# Patient Record
Sex: FEMALE | Race: WHITE
[De-identification: names, ages, dates, MRNs, and addresses within clinical notes are randomized per-mention and may not be internally consistent; named-entity substitution may affect disease eponyms.]

---

## 2018-03-25 ENCOUNTER — HOSPITAL ENCOUNTER (OUTPATIENT)
Dept: HOSPITAL 17 - PHED | Age: 63
Setting detail: OBSERVATION
LOS: 1 days | Discharge: HOME | End: 2018-03-26
Attending: HOSPITALIST | Admitting: HOSPITALIST
Payer: COMMERCIAL

## 2018-03-25 VITALS
RESPIRATION RATE: 18 BRPM | HEART RATE: 80 BPM | SYSTOLIC BLOOD PRESSURE: 113 MMHG | DIASTOLIC BLOOD PRESSURE: 65 MMHG | OXYGEN SATURATION: 98 %

## 2018-03-25 VITALS
DIASTOLIC BLOOD PRESSURE: 61 MMHG | SYSTOLIC BLOOD PRESSURE: 95 MMHG | OXYGEN SATURATION: 98 % | HEART RATE: 79 BPM | RESPIRATION RATE: 18 BRPM

## 2018-03-25 VITALS
TEMPERATURE: 97.6 F | HEART RATE: 91 BPM | OXYGEN SATURATION: 92 % | DIASTOLIC BLOOD PRESSURE: 79 MMHG | SYSTOLIC BLOOD PRESSURE: 130 MMHG | RESPIRATION RATE: 17 BRPM

## 2018-03-25 VITALS
SYSTOLIC BLOOD PRESSURE: 98 MMHG | HEART RATE: 85 BPM | DIASTOLIC BLOOD PRESSURE: 53 MMHG | OXYGEN SATURATION: 98 % | TEMPERATURE: 97.7 F | RESPIRATION RATE: 18 BRPM

## 2018-03-25 VITALS — HEART RATE: 81 BPM

## 2018-03-25 DIAGNOSIS — Z79.899: ICD-10-CM

## 2018-03-25 DIAGNOSIS — Z96.653: ICD-10-CM

## 2018-03-25 DIAGNOSIS — I10: ICD-10-CM

## 2018-03-25 DIAGNOSIS — K75.4: ICD-10-CM

## 2018-03-25 DIAGNOSIS — Z79.82: ICD-10-CM

## 2018-03-25 DIAGNOSIS — E11.9: ICD-10-CM

## 2018-03-25 DIAGNOSIS — Z82.49: ICD-10-CM

## 2018-03-25 DIAGNOSIS — E78.00: ICD-10-CM

## 2018-03-25 DIAGNOSIS — N17.9: ICD-10-CM

## 2018-03-25 DIAGNOSIS — M54.12: ICD-10-CM

## 2018-03-25 DIAGNOSIS — R07.89: Primary | ICD-10-CM

## 2018-03-25 LAB
ALBUMIN SERPL-MCNC: 3.4 GM/DL (ref 3.4–5)
ALP SERPL-CCNC: 105 U/L (ref 45–117)
ALT SERPL-CCNC: 38 U/L (ref 10–53)
AST SERPL-CCNC: 26 U/L (ref 15–37)
BASOPHILS # BLD AUTO: 0 TH/MM3 (ref 0–0.2)
BASOPHILS NFR BLD: 0.3 % (ref 0–2)
BILIRUB SERPL-MCNC: 0.3 MG/DL (ref 0.2–1)
BUN SERPL-MCNC: 21 MG/DL (ref 7–18)
CALCIUM SERPL-MCNC: 9.6 MG/DL (ref 8.5–10.1)
CHLORIDE SERPL-SCNC: 109 MEQ/L (ref 98–107)
CREAT SERPL-MCNC: 1.3 MG/DL (ref 0.5–1)
EOSINOPHIL # BLD: 0.1 TH/MM3 (ref 0–0.4)
EOSINOPHIL NFR BLD: 0.7 % (ref 0–4)
ERYTHROCYTE [DISTWIDTH] IN BLOOD BY AUTOMATED COUNT: 15.2 % (ref 11.6–17.2)
GFR SERPLBLD BASED ON 1.73 SQ M-ARVRAT: 41 ML/MIN (ref 89–?)
GLUCOSE SERPL-MCNC: 69 MG/DL (ref 74–106)
HCO3 BLD-SCNC: 21.9 MEQ/L (ref 21–32)
HCT VFR BLD CALC: 33.4 % (ref 35–46)
HGB BLD-MCNC: 10.8 GM/DL (ref 11.6–15.3)
INR PPP: 1 RATIO
LYMPHOCYTES # BLD AUTO: 1.6 TH/MM3 (ref 1–4.8)
LYMPHOCYTES NFR BLD AUTO: 14.1 % (ref 9–44)
MAGNESIUM SERPL-MCNC: 2 MG/DL (ref 1.5–2.5)
MCH RBC QN AUTO: 28.2 PG (ref 27–34)
MCHC RBC AUTO-ENTMCNC: 32.3 % (ref 32–36)
MCV RBC AUTO: 87.3 FL (ref 80–100)
MONOCYTE #: 0.8 TH/MM3 (ref 0–0.9)
MONOCYTES NFR BLD: 7.1 % (ref 0–8)
NEUTROPHILS # BLD AUTO: 8.8 TH/MM3 (ref 1.8–7.7)
NEUTROPHILS NFR BLD AUTO: 77.8 % (ref 16–70)
PLATELET # BLD: 243 TH/MM3 (ref 150–450)
PMV BLD AUTO: 7.6 FL (ref 7–11)
PROT SERPL-MCNC: 7.2 GM/DL (ref 6.4–8.2)
PROTHROMBIN TIME: 10.2 SEC (ref 9.8–11.6)
RBC # BLD AUTO: 3.82 MIL/MM3 (ref 4–5.3)
SODIUM SERPL-SCNC: 140 MEQ/L (ref 136–145)
TROPONIN I SERPL-MCNC: (no result) NG/ML (ref 0.02–0.05)
WBC # BLD AUTO: 11.3 TH/MM3 (ref 4–11)

## 2018-03-25 PROCEDURE — 78452 HT MUSCLE IMAGE SPECT MULT: CPT

## 2018-03-25 PROCEDURE — 93005 ELECTROCARDIOGRAM TRACING: CPT

## 2018-03-25 PROCEDURE — 83690 ASSAY OF LIPASE: CPT

## 2018-03-25 PROCEDURE — 83735 ASSAY OF MAGNESIUM: CPT

## 2018-03-25 PROCEDURE — G0378 HOSPITAL OBSERVATION PER HR: HCPCS

## 2018-03-25 PROCEDURE — 84484 ASSAY OF TROPONIN QUANT: CPT

## 2018-03-25 PROCEDURE — A9502 TC99M TETROFOSMIN: HCPCS

## 2018-03-25 PROCEDURE — 85730 THROMBOPLASTIN TIME PARTIAL: CPT

## 2018-03-25 PROCEDURE — 96361 HYDRATE IV INFUSION ADD-ON: CPT

## 2018-03-25 PROCEDURE — 85610 PROTHROMBIN TIME: CPT

## 2018-03-25 PROCEDURE — 85025 COMPLETE CBC W/AUTO DIFF WBC: CPT

## 2018-03-25 PROCEDURE — 99285 EMERGENCY DEPT VISIT HI MDM: CPT

## 2018-03-25 PROCEDURE — 71045 X-RAY EXAM CHEST 1 VIEW: CPT

## 2018-03-25 PROCEDURE — 96374 THER/PROPH/DIAG INJ IV PUSH: CPT

## 2018-03-25 PROCEDURE — 82552 ASSAY OF CPK IN BLOOD: CPT

## 2018-03-25 PROCEDURE — 96372 THER/PROPH/DIAG INJ SC/IM: CPT

## 2018-03-25 PROCEDURE — 82550 ASSAY OF CK (CPK): CPT

## 2018-03-25 PROCEDURE — 80048 BASIC METABOLIC PNL TOTAL CA: CPT

## 2018-03-25 PROCEDURE — 93017 CV STRESS TEST TRACING ONLY: CPT

## 2018-03-25 PROCEDURE — 80053 COMPREHEN METABOLIC PANEL: CPT

## 2018-03-25 PROCEDURE — 82948 REAGENT STRIP/BLOOD GLUCOSE: CPT

## 2018-03-25 NOTE — PD
HPI


Chief Complaint:  Chest Pain


Time Seen by Provider:  17:44


Travel History


International Travel<30 days:  No


Contact w/Intl Traveler<30days:  No


Traveled to known affect area:  No





History of Present Illness


HPI


62-year-old female describes chest pain retrosternal with radiation to the 

bilateral arms to the level of the elbows.  She also reports some pain along 

the neck.  It has been present for about 2 weeks.  She reports no benefit from 

nitroglycerin this morning.  She has history of diabetes hypertension 

hyperlipidemia.  She has no smoking history.  There is a history of coronary 

artery disease in her family.  The patient denies any personal history of 

coronary artery disease.  An exertional component to the chest pain as noted.  

There's been no fever or cough.





PFSH


Past Medical History


High Cholesterol:  Yes


Diabetes:  Yes


Patient Takes Glucophage:  No


Hepatitis:  Yes


Hypertension:  Yes


Influenza Vaccination:  Yes


Pregnant?:  Not Pregnant





Past Surgical History


Abdominal Surgery:  Yes (HERNIAS)


Cholecystectomy:  Yes


Joint Replacement:  Yes (BILAT KNEES)





Social History


Alcohol Use:  No


Tobacco Use:  No


Substance Use:  No





Allergies-Medications


(Allergen,Severity, Reaction):  


Coded Allergies:  


     Iodinated Contrast- Oral and IV Dye (Verified  Allergy, Severe, HIVES, 3/25

/18)


Reported Meds & Prescriptions





Reported Meds & Active Scripts


Active


Reported


Nitroglycerin SL (Nitroglycerin) 0.4 Mg Subl 0.4 Mg SL AS DIRECTED PRN


     ONE TABLET UNDER THE TONGUE AS NEEDED FOR CHEST PAIN, MAY REPEAT EVERY


     FIVE MINUTES FOR A TOTAL OF 3 DOSES OR CALL 911 IF NO RELIEF


Prednisone 5 Mg Tab 5 Mg PO DAILY


Hydrocodone-Acetaminophen  mg Tab 1 Tab PO Q4H PRN


Zinc Sulfate 220 Mg Tab 220 Mg PO DAILY


Calcium 600 with Vitamin D (Calcium Carbonate-Cholecalciferol) 600-400 mg-Unit 

Tab 1 Tab PO DAILY


Magnesium Oxide 400 Mg Tab 400 Mg PO DAILY


Lantus Inj (Insulin Glargine) 1,000 Unit/10 Ml Vial 64 Units SQ HS


Novolin N Inj (Insulin Human NPH) 1,000 Unit/10 Ml Vial 0 SQ AS DIRECTED


     Sliding Scale As Directed.


Vitamin D-1000 (Cholecalciferol) 1,000 Unit Tab 50,000 Units PO WEEKLY


Fluticasone Nasal Spray 50 Mcg/Act Naspr 50 Mcg EACH NARE BID


     50 mcg/spray


Atorvastatin (Atorvastatin Calcium) 40 Mg Tab 40 Mg PO HS


Januvia (Sitagliptin Phosphate) 100 Mg Tab 100 Mg PO DAILY


Estrace (Estradiol) 0.5 Mg Tab 0.5 Mg PO DAILY


Nexium (Esomeprazole DR) 20 Mg Capdr 20 Mg PO DAILY


Alprazolam 0.5 Mg Tab 0.5 Mg PO Q8H PRN


Budesonide DR (Budesonide) 3 Mg Capdr 3 Mg PO TID


Verapamil SR (Verapamil HCl) 180 Mg Cap 180 Mg PO DAILY


Tricor (Fenofibrate) 145 Mg Tab 145 Mg PO DAILY


     Takw with food.


Tizanidine (Tizanidine HCl) 4 Mg Cap 4 Mg PO BID


Isosorbide Mononitrate ER (Isosorbide Mononitrate) 30 Mg Latoya 30 Mg PO DAILY


Gabapentin 300 Mg Cap 300 Mg PO TID


Clonidine (Clonidine HCl) 0.1 Mg Tab 0.1 Mg PO BID


Aspirin EC (Aspirin) 325 Mg Tabdr 325 Mg PO DAILY


Potassium Chloride ER (Potassium Chloride) 8 Meq Tab 24 Meq PO DAILY


Silenor (Doxepin (Sleep)) 6 Mg Tab 6 Mg PO HS


Norvasc (Amlodipine Besylate) 5 Mg Tab 5 Mg PO DAILY








Review of Systems


Except as stated in HPI:  all other systems reviewed are Neg


General / Constitutional:  No: Fever





Physical Exam


Narrative


GENERAL: 62-year-old female pleasant well-nourished well-developed





Vital Signs








  Date Time  Temp Pulse Resp B/P (MAP) Pulse Ox O2 Delivery O2 Flow Rate FiO2


 


3/25/18 18:29   18     


 


3/25/18 18:05  80 18 113/65 (81) 98 Room Air  


 


3/25/18 18:05     98 Room Air  


 


3/25/18 17:49  90      


 


3/25/18 17:45 97.7 85 18 98/53 (68) 98   








SKIN: Warm and dry.


HEAD: Atraumatic. Normocephalic. 


EYES: Pupils equal and round. No scleral icterus. No injection or drainage. 


ENT: No nasal bleeding or discharge.  Mucous membranes pink and moist.


NECK: Trachea midline. No JVD. 


CARDIOVASCULAR: Regular rate and rhythm.  


RESPIRATORY: No accessory muscle use. Clear to auscultation. Breath sounds 

equal bilaterally. 


GASTROINTESTINAL: Abdomen soft, non-tender, nondistended. Hepatic and splenic 

margins not palpable. 


MUSCULOSKELETAL: Extremities without clubbing, cyanosis, or edema. No obvious 

deformities. 


NEUROLOGICAL: Awake and alert. No obvious cranial nerve deficits.  Motor 

grossly within normal limits. Five out of 5 muscle strength in the arms and 

legs.  Normal speech.


PSYCHIATRIC: Appropriate mood and affect; insight and judgment normal.





Data


Data


Last Documented VS





Vital Signs








  Date Time  Temp Pulse Resp B/P (MAP) Pulse Ox O2 Delivery O2 Flow Rate FiO2


 


3/25/18 18:29   18     


 


3/25/18 18:05  80  113/65 (81) 98 Room Air  


 


3/25/18 17:45 97.7       








Orders





 Orders


Electrocardiogram (3/25/18 17:59)


Ckmb (Isoenzyme) Profile (3/25/18 17:59)


Complete Blood Count With Diff (3/25/18 17:59)


Comprehensive Metabolic Panel (3/25/18 17:59)


Magnesium (Mg) (3/25/18 17:59)


Prothrombin Time / Inr (Pt) (3/25/18 17:59)


Act Partial Throm Time (Ptt) (3/25/18 17:59)


Troponin I (3/25/18 17:59)


Lipase (3/25/18 17:59)


Chest, Single Ap (3/25/18 17:59)


Ecg Monitoring (3/25/18 17:59)


Iv Access Insert/Monitor (3/25/18 17:59)


Oximetry (3/25/18 17:59)


Oxygen Administration (3/25/18 17:59)


Aspirin (Aspirin) (3/25/18 18:00)


Morphine Inj (Morphine Inj) (3/25/18 18:00)


Sodium Chloride 0.9% Flush (Ns Flush) (3/25/18 18:00)


Sodium Chlorid 0.9% 500 Ml Inj (Ns 500 M (3/25/18 18:00)


CKMB (3/25/18 17:50)


CKMB% (3/25/18 17:50)





Labs





Laboratory Tests








Test


  3/25/18


17:50


 


White Blood Count 11.3 TH/MM3 


 


Red Blood Count 3.82 MIL/MM3 


 


Hemoglobin 10.8 GM/DL 


 


Hematocrit 33.4 % 


 


Mean Corpuscular Volume 87.3 FL 


 


Mean Corpuscular Hemoglobin 28.2 PG 


 


Mean Corpuscular Hemoglobin


Concent 32.3 % 


 


 


Red Cell Distribution Width 15.2 % 


 


Platelet Count 243 TH/MM3 


 


Mean Platelet Volume 7.6 FL 


 


Neutrophils (%) (Auto) 77.8 % 


 


Lymphocytes (%) (Auto) 14.1 % 


 


Monocytes (%) (Auto) 7.1 % 


 


Eosinophils (%) (Auto) 0.7 % 


 


Basophils (%) (Auto) 0.3 % 


 


Neutrophils # (Auto) 8.8 TH/MM3 


 


Lymphocytes # (Auto) 1.6 TH/MM3 


 


Monocytes # (Auto) 0.8 TH/MM3 


 


Eosinophils # (Auto) 0.1 TH/MM3 


 


Basophils # (Auto) 0.0 TH/MM3 


 


CBC Comment DIFF FINAL 


 


Differential Comment  


 


Prothrombin Time 10.2 SEC 


 


Prothromb Time International


Ratio 1.0 RATIO 


 


 


Activated Partial


Thromboplast Time 21.7 SEC 


 


 


Blood Urea Nitrogen 21 MG/DL 


 


Creatinine 1.30 MG/DL 


 


Random Glucose 69 MG/DL 


 


Total Protein 7.2 GM/DL 


 


Albumin 3.4 GM/DL 


 


Calcium Level 9.6 MG/DL 


 


Magnesium Level 2.0 MG/DL 


 


Alkaline Phosphatase 105 U/L 


 


Aspartate Amino Transf


(AST/SGOT) 26 U/L 


 


 


Alanine Aminotransferase


(ALT/SGPT) 38 U/L 


 


 


Total Bilirubin 0.3 MG/DL 


 


Sodium Level 140 MEQ/L 


 


Potassium Level 3.6 MEQ/L 


 


Chloride Level 109 MEQ/L 


 


Carbon Dioxide Level 21.9 MEQ/L 


 


Anion Gap 9 MEQ/L 


 


Estimat Glomerular Filtration


Rate 41 ML/MIN 


 


 


Total Creatine Kinase 103 U/L 


 


Creatine Kinase MB 4.3 NG/ML 


 


Troponin I


  LESS THAN 0.02


NG/ML


 


Lipase 384 U/L 











MDM


Medical Decision Making


Medical Screen Exam Complete:  Yes


Emergency Medical Condition:  Yes


Medical Record Reviewed:  Yes


Differential Diagnosis


NSTEMI, unstable angina, coronary vasospasm, PE, PTX, aortic dissection, 

pericarditis, myocarditis, endocarditis, PNA, esophageal disease, aneurysm, 

musculoskeletal etiologies, anxiety, cocaine/sympathomimetic abuse


Narrative Course


CBC & BMP Diagram


3/25/18 17:50








Total Protein 7.2, Albumin 3.4, Calcium Level 9.6, Magnesium Level 2.0, 

Alkaline Phosphatase 105, Aspartate Amino Transf (AST/SGOT) 26, Alanine 

Aminotransferase (ALT/SGPT) 38, Total Bilirubin 0.3





Troponin pending at time of dictation with case endorsed oncoming provider at 7 

PM to follow-up and disposition patient.  Chest pain center if negative





EKG shows sinus rhythm without ischemic injury pattern


Chest x-ray shows no acute cardiopulmonary disease











Berlin Tenorio MD Mar 25, 2018 19:03

## 2018-03-25 NOTE — PD
Physical Exam


Date Seen by Provider:  Mar 25, 2018


Time Seen by Provider:  19:00


Narrative


The patient is a 62-year-old female who was initially evaluated by the previous 

physician, Dr. Tenorio.  Please refer to the initial history, physical, 

diagnostic evaluation, and treatment modality plan.  The patient was signed out 

at 7 PM laboratory evaluation pending for chest pain.





Data


Data


Last Documented VS





Vital Signs








  Date Time  Temp Pulse Resp B/P (MAP) Pulse Ox O2 Delivery O2 Flow Rate FiO2


 


3/25/18 18:29   18     


 


3/25/18 18:05  80  113/65 (81) 98 Room Air  


 


3/25/18 17:45 97.7       








Orders





 Orders


Electrocardiogram (3/25/18 17:59)


Ckmb (Isoenzyme) Profile (3/25/18 17:59)


Complete Blood Count With Diff (3/25/18 17:59)


Comprehensive Metabolic Panel (3/25/18 17:59)


Magnesium (Mg) (3/25/18 17:59)


Prothrombin Time / Inr (Pt) (3/25/18 17:59)


Act Partial Throm Time (Ptt) (3/25/18 17:59)


Troponin I (3/25/18 17:59)


Lipase (3/25/18 17:59)


Chest, Single Ap (3/25/18 17:59)


Ecg Monitoring (3/25/18 17:59)


Iv Access Insert/Monitor (3/25/18 17:59)


Oximetry (3/25/18 17:59)


Oxygen Administration (3/25/18 17:59)


Aspirin (Aspirin) (3/25/18 18:00)


Morphine Inj (Morphine Inj) (3/25/18 18:00)


Sodium Chloride 0.9% Flush (Ns Flush) (3/25/18 18:00)


Sodium Chlorid 0.9% 500 Ml Inj (Ns 500 M (3/25/18 18:00)


CKMB (3/25/18 17:50)


CKMB% (3/25/18 17:50)


Admit Order (Ed Use Only) (3/25/18 19:16)





Labs





Laboratory Tests








Test


  3/25/18


17:50


 


White Blood Count 11.3 TH/MM3 


 


Red Blood Count 3.82 MIL/MM3 


 


Hemoglobin 10.8 GM/DL 


 


Hematocrit 33.4 % 


 


Mean Corpuscular Volume 87.3 FL 


 


Mean Corpuscular Hemoglobin 28.2 PG 


 


Mean Corpuscular Hemoglobin


Concent 32.3 % 


 


 


Red Cell Distribution Width 15.2 % 


 


Platelet Count 243 TH/MM3 


 


Mean Platelet Volume 7.6 FL 


 


Neutrophils (%) (Auto) 77.8 % 


 


Lymphocytes (%) (Auto) 14.1 % 


 


Monocytes (%) (Auto) 7.1 % 


 


Eosinophils (%) (Auto) 0.7 % 


 


Basophils (%) (Auto) 0.3 % 


 


Neutrophils # (Auto) 8.8 TH/MM3 


 


Lymphocytes # (Auto) 1.6 TH/MM3 


 


Monocytes # (Auto) 0.8 TH/MM3 


 


Eosinophils # (Auto) 0.1 TH/MM3 


 


Basophils # (Auto) 0.0 TH/MM3 


 


CBC Comment DIFF FINAL 


 


Differential Comment  


 


Prothrombin Time 10.2 SEC 


 


Prothromb Time International


Ratio 1.0 RATIO 


 


 


Activated Partial


Thromboplast Time 21.7 SEC 


 


 


Blood Urea Nitrogen 21 MG/DL 


 


Creatinine 1.30 MG/DL 


 


Random Glucose 69 MG/DL 


 


Total Protein 7.2 GM/DL 


 


Albumin 3.4 GM/DL 


 


Calcium Level 9.6 MG/DL 


 


Magnesium Level 2.0 MG/DL 


 


Alkaline Phosphatase 105 U/L 


 


Aspartate Amino Transf


(AST/SGOT) 26 U/L 


 


 


Alanine Aminotransferase


(ALT/SGPT) 38 U/L 


 


 


Total Bilirubin 0.3 MG/DL 


 


Sodium Level 140 MEQ/L 


 


Potassium Level 3.6 MEQ/L 


 


Chloride Level 109 MEQ/L 


 


Carbon Dioxide Level 21.9 MEQ/L 


 


Anion Gap 9 MEQ/L 


 


Estimat Glomerular Filtration


Rate 41 ML/MIN 


 


 


Total Creatine Kinase 103 U/L 


 


Creatine Kinase MB 4.3 NG/ML 


 


Troponin I


  LESS THAN 0.02


NG/ML


 


Lipase 384 U/L 











[facility]


Medical Record Reviewed:  Yes


Supervised Visit with FLORENTIN:  No


Interpretation(s)


Chest x-ray reveals compensated cardiomegaly, otherwise unremarkable.





EKG reveals normal sinus rhythm with a rate 82.  Nonspecific T-wave changes.








Laboratory Tests








Test


  3/25/18


17:50


 


White Blood Count 11.3 TH/MM3 


 


Red Blood Count 3.82 MIL/MM3 


 


Hemoglobin 10.8 GM/DL 


 


Hematocrit 33.4 % 


 


Mean Corpuscular Volume 87.3 FL 


 


Mean Corpuscular Hemoglobin 28.2 PG 


 


Mean Corpuscular Hemoglobin


Concent 32.3 % 


 


 


Red Cell Distribution Width 15.2 % 


 


Platelet Count 243 TH/MM3 


 


Mean Platelet Volume 7.6 FL 


 


Neutrophils (%) (Auto) 77.8 % 


 


Lymphocytes (%) (Auto) 14.1 % 


 


Monocytes (%) (Auto) 7.1 % 


 


Eosinophils (%) (Auto) 0.7 % 


 


Basophils (%) (Auto) 0.3 % 


 


Neutrophils # (Auto) 8.8 TH/MM3 


 


Lymphocytes # (Auto) 1.6 TH/MM3 


 


Monocytes # (Auto) 0.8 TH/MM3 


 


Eosinophils # (Auto) 0.1 TH/MM3 


 


Basophils # (Auto) 0.0 TH/MM3 


 


CBC Comment DIFF FINAL 


 


Differential Comment  


 


Prothrombin Time 10.2 SEC 


 


Prothromb Time International


Ratio 1.0 RATIO 


 


 


Activated Partial


Thromboplast Time 21.7 SEC 


 


 


Blood Urea Nitrogen 21 MG/DL 


 


Creatinine 1.30 MG/DL 


 


Random Glucose 69 MG/DL 


 


Total Protein 7.2 GM/DL 


 


Albumin 3.4 GM/DL 


 


Calcium Level 9.6 MG/DL 


 


Magnesium Level 2.0 MG/DL 


 


Alkaline Phosphatase 105 U/L 


 


Aspartate Amino Transf


(AST/SGOT) 26 U/L 


 


 


Alanine Aminotransferase


(ALT/SGPT) 38 U/L 


 


 


Total Bilirubin 0.3 MG/DL 


 


Sodium Level 140 MEQ/L 


 


Potassium Level 3.6 MEQ/L 


 


Chloride Level 109 MEQ/L 


 


Carbon Dioxide Level 21.9 MEQ/L 


 


Anion Gap 9 MEQ/L 


 


Estimat Glomerular Filtration


Rate 41 ML/MIN 


 


 


Total Creatine Kinase 103 U/L 


 


Creatine Kinase MB 4.3 NG/ML 


 


Troponin I


  LESS THAN 0.02


NG/ML


 


Lipase 384 U/L 








Differential Diagnosis


Differential diagnosis includes acute coronary syndrome, STEMI, pericarditis, 

myocarditis, GERD, esophageal spasm, dissection, pulmonary embolism, 

costochondritis.


Narrative Course


The patient is a 62-year-old female who was initially evaluated by the previous 

physician.  Please refer to the initial history, physical, diagnostic evaluation

, treatment modality plan.  The patient does have multiple risk factors 

including hypertension, hyperlipidemia, diabetes, and family history.  EKG 

revealed nonspecific T-wave changes.  Initial EKG revealed a troponin less than 

0.02.  Therefore, patient will be placed in the chest pain center for serial 

cardiac enzymes and possible stress test.


Physician Communication


Physician Communication


[facility] serial 

cardiac enzymes and possible stress test.  I discussed the patient with Dr. Perez who agrees with 23 hour observation.





Diagnosis





 Primary Impression:  


 Chest pain


 Qualified Codes:  R07.9 - Chest pain, unspecified





Admitting Information


Admitting Physician Requests:  Observation


Condition:  Stable











Dorsey,Reading Z. MD Mar 25, 2018 19:03

## 2018-03-25 NOTE — RADRPT
EXAM DATE/TIME:  03/25/2018 18:17 

 

[facility] COMPARISON:     No previous studies available for comparison.

 

                     

INDICATIONS :     Chest pain.

                     

MEDICAL HISTORY :     Radiculopathy.          

SURGICAL HISTORY :     Fusion, cervical.   

ENCOUNTER:     Initial                                        

ACUITY:     1 day      

PAIN SCORE:     6/10

LOCATION:     Bilateral chest 

 

FINDINGS:     

 

The lungs are clear. The heart is minimally enlarged. The pulmonary vascularity is normal. There is n
o evidence for infiltrate or failure.

Degenerative changes about the right shoulder

CONCLUSION:     Compensated cardiomegaly otherwise negative 

 

     

Board Certified Radiologist.

 This report was verified electronically.

## 2018-03-26 VITALS
TEMPERATURE: 96.6 F | RESPIRATION RATE: 16 BRPM | HEART RATE: 84 BPM | OXYGEN SATURATION: 95 % | DIASTOLIC BLOOD PRESSURE: 54 MMHG | SYSTOLIC BLOOD PRESSURE: 86 MMHG

## 2018-03-26 VITALS
TEMPERATURE: 96.7 F | HEART RATE: 74 BPM | OXYGEN SATURATION: 99 % | RESPIRATION RATE: 19 BRPM | DIASTOLIC BLOOD PRESSURE: 54 MMHG | SYSTOLIC BLOOD PRESSURE: 121 MMHG

## 2018-03-26 VITALS
OXYGEN SATURATION: 98 % | SYSTOLIC BLOOD PRESSURE: 104 MMHG | DIASTOLIC BLOOD PRESSURE: 56 MMHG | HEART RATE: 73 BPM | TEMPERATURE: 97.6 F | RESPIRATION RATE: 16 BRPM

## 2018-03-26 VITALS — OXYGEN SATURATION: 98 %

## 2018-03-26 VITALS
DIASTOLIC BLOOD PRESSURE: 94 MMHG | TEMPERATURE: 97.1 F | OXYGEN SATURATION: 99 % | HEART RATE: 80 BPM | SYSTOLIC BLOOD PRESSURE: 137 MMHG | RESPIRATION RATE: 15 BRPM

## 2018-03-26 VITALS
DIASTOLIC BLOOD PRESSURE: 63 MMHG | HEART RATE: 86 BPM | SYSTOLIC BLOOD PRESSURE: 114 MMHG | OXYGEN SATURATION: 99 % | TEMPERATURE: 96.4 F | RESPIRATION RATE: 16 BRPM

## 2018-03-26 VITALS
TEMPERATURE: 98.1 F | SYSTOLIC BLOOD PRESSURE: 128 MMHG | HEART RATE: 89 BPM | RESPIRATION RATE: 16 BRPM | OXYGEN SATURATION: 94 % | DIASTOLIC BLOOD PRESSURE: 80 MMHG

## 2018-03-26 LAB
BASOPHILS # BLD AUTO: 0 TH/MM3 (ref 0–0.2)
BASOPHILS NFR BLD: 0.4 % (ref 0–2)
BUN SERPL-MCNC: 16 MG/DL (ref 7–18)
CALCIUM SERPL-MCNC: 8.7 MG/DL (ref 8.5–10.1)
CHLORIDE SERPL-SCNC: 106 MEQ/L (ref 98–107)
CREAT SERPL-MCNC: 1.1 MG/DL (ref 0.5–1)
EOSINOPHIL # BLD: 0.1 TH/MM3 (ref 0–0.4)
EOSINOPHIL NFR BLD: 0.8 % (ref 0–4)
ERYTHROCYTE [DISTWIDTH] IN BLOOD BY AUTOMATED COUNT: 14.9 % (ref 11.6–17.2)
GFR SERPLBLD BASED ON 1.73 SQ M-ARVRAT: 50 ML/MIN (ref 89–?)
GLUCOSE SERPL-MCNC: 325 MG/DL (ref 74–106)
HCO3 BLD-SCNC: 24.4 MEQ/L (ref 21–32)
HCT VFR BLD CALC: 32.4 % (ref 35–46)
HGB BLD-MCNC: 10.6 GM/DL (ref 11.6–15.3)
LYMPHOCYTES # BLD AUTO: 1.3 TH/MM3 (ref 1–4.8)
LYMPHOCYTES NFR BLD AUTO: 16.9 % (ref 9–44)
MCH RBC QN AUTO: 28.4 PG (ref 27–34)
MCHC RBC AUTO-ENTMCNC: 32.6 % (ref 32–36)
MCV RBC AUTO: 87.1 FL (ref 80–100)
MONOCYTE #: 0.4 TH/MM3 (ref 0–0.9)
MONOCYTES NFR BLD: 5.4 % (ref 0–8)
NEUTROPHILS # BLD AUTO: 5.8 TH/MM3 (ref 1.8–7.7)
NEUTROPHILS NFR BLD AUTO: 76.5 % (ref 16–70)
PLATELET # BLD: 203 TH/MM3 (ref 150–450)
PMV BLD AUTO: 8.4 FL (ref 7–11)
RBC # BLD AUTO: 3.72 MIL/MM3 (ref 4–5.3)
SODIUM SERPL-SCNC: 140 MEQ/L (ref 136–145)
TROPONIN I SERPL-MCNC: (no result) NG/ML (ref 0.02–0.05)
TROPONIN I SERPL-MCNC: (no result) NG/ML (ref 0.02–0.05)
WBC # BLD AUTO: 7.6 TH/MM3 (ref 4–11)

## 2018-03-26 RX ADMIN — INSULIN ASPART SCH: 100 INJECTION, SOLUTION INTRAVENOUS; SUBCUTANEOUS at 12:00

## 2018-03-26 RX ADMIN — PHENYTOIN SODIUM SCH MLS/HR: 50 INJECTION INTRAMUSCULAR; INTRAVENOUS at 11:09

## 2018-03-26 RX ADMIN — HEPARIN SODIUM SCH UNITS: 10000 INJECTION, SOLUTION INTRAVENOUS; SUBCUTANEOUS at 13:25

## 2018-03-26 RX ADMIN — HEPARIN SODIUM SCH UNITS: 10000 INJECTION, SOLUTION INTRAVENOUS; SUBCUTANEOUS at 06:09

## 2018-03-26 RX ADMIN — PHENYTOIN SODIUM SCH MLS/HR: 50 INJECTION INTRAMUSCULAR; INTRAVENOUS at 01:36

## 2018-03-26 RX ADMIN — INSULIN ASPART SCH: 100 INJECTION, SOLUTION INTRAVENOUS; SUBCUTANEOUS at 08:00

## 2018-03-26 RX ADMIN — GABAPENTIN SCH MG: 300 CAPSULE ORAL at 09:02

## 2018-03-26 RX ADMIN — GABAPENTIN SCH MG: 300 CAPSULE ORAL at 13:25

## 2018-03-26 RX ADMIN — INSULIN ASPART SCH: 100 INJECTION, SOLUTION INTRAVENOUS; SUBCUTANEOUS at 16:39

## 2018-03-26 NOTE — EKG
Date Performed: 03/26/2018       Time Performed: 00:37:45

 

PTAGE:      62 years

 

EKG:      Sinus rhythm 

 

 NONSPECIFIC T-WAVE ABNORMALITY BORDERLINE ECG

 

PREVIOUS TRACING       : 03/25/2018 17.39 Since previous tracing, no significant change noted

 

DOCTOR:   Brandt Harrison  Interpretating Date/Time  03/26/2018 16:02:29

## 2018-03-26 NOTE — EKG
Date Performed: 03/26/2018       Time Performed: 03:06:36

 

PTAGE:      62 years

 

EKG:      Sinus rhythm 

 

 POSSIBLE LEFT ATRIAL ENLARGEMENT NONSPECIFIC T-WAVE ABNORMALITY BORDERLINE ECG

 

PREVIOUS TRACING       : 03/25/2018 17.39 Since previous tracing, no significant change noted

 

DOCTOR:   Brandt Harrison  Interpretating Date/Time  03/26/2018 16:01:53

## 2018-03-26 NOTE — HHI.HP
__________________________________________________





[facility]


Service


[facility]


Primary Care Physician


Non-Staff


Admission Diagnosis


Chest pain rule out ACS


Diagnoses:  


(1) Chest pain


Diagnosis:  Principal





Chief Complaint:  


Chest pain


Travel History


International Travel<30 Days:  No


Contact w/Intl Traveler <30 Da:  No


Traveled to Known Affected Are:  No


History of Present Illness


This is a 62-year-old female patient with a known medical history of diabetes, 

hypertension, autoimmune hepatitis who presented to the ED with complaints of 

retrosternal chest pain with radiation to right upper extremity.  Patient 

states that this pain has been occurring intermittently over the past month she 

states that it has been worsening over the past 2 weeks.  She states that her 

neck has been painful at times and she has been noticing right upper extremity 

numbness and tingling.  She also does complain of right chest discomfort, 

characterized as a pressing in nature and heavy across her chest radiating to 

her left side.  She rates the pain a 10 out of 10 on pain scale, intermittent 

in nature and comes and goes without any known really alleviating or 

aggravating factors.  Patient does state she took nitroglycerin yesterday and 

aspirin with minimal relief.  She also states that she thought maybe this had 

to do with her heartburn which she took Tums for with minimal relief.  Patient 

denies any associated nausea, vomiting, diaphoresis or shortness of breath.  

She does state that her appetite has been lower lately.  Patient does report a 

low blood pressure at home 80/50.  She does have a cardiac history and last saw 

her cardiologist a couple months ago and placed on Norvasc in addition to her 

other heart medications.  Patient underwent a cardiac stress test and cardiac 

catheterization in  and was reportedly negative.  She has also had cervical 

spine unspecified surgery as well as lumbar surgery and has not followed with 

her neurosurgeon for quite some time.  Denies any recent illness including fever

, chills, cough, abdominal pain, nausea, vomiting, diarrhea or dysuria.





Review of Systems


Constitutional:  DENIES: Fatigue, Fever, Chills


Eyes:  DENIES: Blurred vision, Diplopia


Respiratory:  DENIES: Cough, Sputum production, Shortness of breath


Cardiovascular:  COMPLAINS OF: Chest pain, DENIES: Palpitations


Gastrointestinal:  DENIES: Abdominal pain


Musculoskeletal:  COMPLAINS OF: Joint pain (Right elbow and shoulder)


Neurologic:  DENIES: Abnormal gait


Psychiatric:  COMPLAINS OF: Anxiety


Except as stated in HPI:  all other systems reviewed are Neg





Past Family Social History


Past Medical History


Diabetes


Hypertension


History of autoimmune hepatitis


Hyperlipidemia


Past Surgical History


Bilateral knee replacement


Bilateral rotator cuff replacement


Cholecystectomy


Hernia repair


Reported Medications


Active


Reported


Nitroglycerin SL (Nitroglycerin) 0.4 Mg Subl 0.4 Mg SL AS DIRECTED PRN


     ONE TABLET UNDER THE TONGUE AS NEEDED FOR CHEST PAIN, MAY REPEAT EVERY


     FIVE MINUTES FOR A TOTAL OF 3 DOSES OR CALL 911 IF NO RELIEF


Prednisone 5 Mg Tab 5 Mg PO DAILY


Hydrocodone-Acetaminophen  mg Tab 1 Tab PO Q4H PRN


Zinc Sulfate 220 Mg Tab 220 Mg PO DAILY


Calcium 600 with Vitamin D (Calcium Carbonate-Cholecalciferol) 600-400 mg-Unit 

Tab 1 Tab PO DAILY


Magnesium Oxide 400 Mg Tab 400 Mg PO DAILY


Lantus Inj (Insulin Glargine) 1,000 Unit/10 Ml Vial 64 Units SQ HS


Novolin N Inj (Insulin Human NPH) 1,000 Unit/10 Ml Vial 0 SQ AS DIRECTED


     Sliding Scale As Directed.


Vitamin D-1000 (Cholecalciferol) 1,000 Unit Tab 50,000 Units PO WEEKLY


Fluticasone Nasal Spray 50 Mcg/Act Naspr 50 Mcg EACH NARE BID


     50 mcg/spray


Atorvastatin (Atorvastatin Calcium) 40 Mg Tab 40 Mg PO HS


Januvia (Sitagliptin Phosphate) 100 Mg Tab 100 Mg PO DAILY


Estrace (Estradiol) 0.5 Mg Tab 0.5 Mg PO DAILY


Nexium (Esomeprazole DR) 20 Mg Capdr 20 Mg PO DAILY


Alprazolam 0.5 Mg Tab 0.5 Mg PO Q8H PRN


Budesonide DR (Budesonide) 3 Mg Capdr 3 Mg PO TID


Verapamil SR (Verapamil HCl) 180 Mg Cap 180 Mg PO DAILY


Tricor (Fenofibrate) 145 Mg Tab 145 Mg PO DAILY


     Takw with food.


Tizanidine (Tizanidine HCl) 4 Mg Cap 4 Mg PO BID


Isosorbide Mononitrate ER (Isosorbide Mononitrate) 30 Mg Latoya 30 Mg PO DAILY


Gabapentin 300 Mg Cap 300 Mg PO TID


Clonidine (Clonidine HCl) 0.1 Mg Tab 0.1 Mg PO BID


Aspirin EC (Aspirin) 325 Mg Tabdr 325 Mg PO DAILY


Potassium Chloride ER (Potassium Chloride) 8 Meq Tab 24 Meq PO DAILY


Silenor (Doxepin (Sleep)) 6 Mg Tab 6 Mg PO HS


Norvasc (Amlodipine Besylate) 5 Mg Tab 5 Mg PO DAILY


Allergies:  


Coded Allergies:  


     Iodinated Contrast- Oral and IV Dye (Verified  Allergy, Severe, HIVES, 3/25

/18)


Active Ordered Medications





Current Medications








 Medications


  (Trade)  Dose


 Ordered  Sig/Sury


 Route  Start Time


 Stop Time Status Last Admin


 


  (NS Flush)  2 ml  UNSCH  PRN


 IVF  3/25/18 18:00


     


 


 


 Sodium Chloride  1,000 ml @ 


 100 mls/hr  Q10H


 IV  3/26/18 00:09


    3/26/18 11:09


 


 


  (NS Flush)  2 ml  UNSCH  PRN


 IV FLUSH  3/26/18 00:15


     


 


 


  (NS Flush)  2 ml  BID


 IV FLUSH  3/26/18 09:00


     


 


 


  (Tylenol)  500 mg  Q4H  PRN


 PO  3/26/18 00:15


     


 


 


  (Norco  7.5-325


 Mg)  1 tab  Q4H  PRN


 PO  3/26/18 00:15


     


 


 


  (Morphine Inj)  2 mg  Q4H  PRN


 IV PUSH  3/26/18 00:15


     


 


 


  (Zofran Inj)  4 mg  Q6H  PRN


 IV PUSH  3/26/18 00:15


     


 


 


  (Nitrostat Sl)  0.4 mg  Q5M  PRN


 SL  3/26/18 00:15


     


 


 


  (Heparin Inj)  5,000 units  Q8HR


 SQ  3/26/18 06:00


    3/26/18 06:09


 


 


  (D50w (Vial) Inj)  50 ml  UNSCH  PRN


 IV PUSH  3/26/18 00:15


     


 


 


  (Glucagon Inj)  1 mg  UNSCH  PRN


 OTHER  3/26/18 00:15


     


 


 


  (NovoLOG


 SUPPLEMENTAL


 SCALE)  1  ACHS SLIDING  SCALE


 SQ  3/26/18 08:00


     


 


 


  (Xanax)  0.5 mg  Q8H  PRN


 PO  3/26/18 00:15


    3/26/18 01:01


 


 


  (Norvasc)  5 mg  DAILY


 PO  3/26/18 09:00


     


 


 


  (Ecotrin Ec)  325 mg  DAILY


 PO  3/26/18 09:00


    3/26/18 09:03


 


 


  (Lipitor)  40 mg  HS


 PO  3/26/18 00:15


    3/26/18 01:01


 


 


  (Catapres)  0.1 mg  BID


 PO  3/26/18 00:15


    3/26/18 01:01


 


 


  (Tricor)  145 mg  DAILY


 PO  3/26/18 09:00


    3/26/18 09:04


 


 


  (Neurontin)  300 mg  TID


 PO  3/26/18 09:00


    3/26/18 09:02


 


 


  (Levemir Inj)  64 units  HS


 SQ  3/26/18 21:00


     


 


 


  (Imdur)  30 mg  DAILY@0700


 PO  3/26/18 07:00


    3/26/18 06:08


 


 


  (Deltasone)  5 mg  DAILY


 PO  3/26/18 09:00


    3/26/18 09:04


 


 


  (Zanaflex)  4 mg  BID


 PO  3/26/18 00:15


    3/26/18 09:02


 


 


  (Isoptin  Sr)  180 mg  DAILY


 PO  3/26/18 09:00


     


 


 


 Patient Own


 Medication  PT OWN


 MED:


 SHANELLE...  TID


 PO  3/26/18 09:00


   Future Hold  


 


 


 Patient Own


 Medication  PT OWN MED:  HS


 PO  3/26/18 21:00


   Future Hold  


 


 


  (Protonix)  20 mg  DAILY


 PO  3/26/18 09:00


    3/26/18 09:04


 








Family History


Family history significant for MI.  Father had an MI at the age of 65.  Mother 

also has a history of MI.  Brother also has a history of MI.


Social History


Denies any tobacco alcohol or illicit drug use.





Physical Exam


Vital Signs





Vital Signs








  Date Time  Temp Pulse Resp B/P (MAP) Pulse Ox O2 Delivery O2 Flow Rate FiO2


 


3/26/18 12:00 96.6 84 16 86/54 (65) 95   


 


3/26/18 08:00 97.6 74 16 104/56 (72) 98   


 


3/26/18 07:49     98 Nasal Cannula 2.00 


 


3/26/18 04:00 96.7 74 19 121/54 (76) 99   


 


3/26/18 03:05     98 Nasal Cannula 2.00 


 


3/26/18 00:40 97.1 80 15 137/94 (108) 99   


 


3/26/18 00:00 98.1 89 16 128/80 (96) 94   


 


3/25/18 21:24  81      


 


3/25/18 20:00 97.6 91 17 130/79 (96) 92   


 


3/25/18 20:00        


 


3/25/18 19:17  79 18 95/61 (72) 98 Room Air  


 


3/25/18 18:29   18     


 


3/25/18 18:05  80 18 113/65 (81) 98 Room Air  


 


3/25/18 18:05     98 Room Air  


 


3/25/18 17:49  90      


 


3/25/18 17:45 97.7 85 18 98/53 (68) 98   








Physical Exam


GENERAL: This is a well-nourished, well-developed patient, in no apparent 

distress.


SKIN: No rashes, ecchymoses or lesions. Cool and dry.


HEAD: Atraumatic. Normocephalic. No temporal or scalp tenderness.


EYES: Pupils equal round and reactive. Extraocular motions intact. No scleral 

icterus. No injection or drainage. 


ENT: Nose without bleeding, purulent drainage or septal hematoma. Throat 

without erythema, tonsillar hypertrophy or exudate. Uvula midline. Airway 

patent.


NECK: Trachea midline. No JVD or lymphadenopathy. Supple.


CARDIOVASCULAR: Regular rate and rhythm without murmurs, gallops, or rubs.  No 

reproducible chest pain to palpation.


RESPIRATORY: Clear to auscultation. Breath sounds equal bilaterally. No wheezes

, rales, or rhonchi.  


GASTROINTESTINAL: Abdomen soft, non-tender, nondistended. No guarding.


MUSCULOSKELETAL: Extremities without clubbing, cyanosis, or edema. No joint 

tenderness, effusion, or edema noted. 


NEUROLOGICAL: Awake and alert. Cranial nerves II through XII intact.  Motor and 

sensory grossly within normal limits. Five out of 5 muscle strength in all 

muscle groups.  Normal speech.


Laboratory





Laboratory Tests








Test


  3/25/18


17:50 3/26/18


00:45 3/26/18


03:50


 


White Blood Count 11.3   


 


Red Blood Count 3.82   


 


Hemoglobin 10.8   


 


Hematocrit 33.4   


 


Mean Corpuscular Volume 87.3   


 


Mean Corpuscular Hemoglobin 28.2   


 


Mean Corpuscular Hemoglobin


Concent 32.3 


  


  


 


 


Red Cell Distribution Width 15.2   


 


Platelet Count 243   


 


Mean Platelet Volume 7.6   


 


Neutrophils (%) (Auto) 77.8   


 


Lymphocytes (%) (Auto) 14.1   


 


Monocytes (%) (Auto) 7.1   


 


Eosinophils (%) (Auto) 0.7   


 


Basophils (%) (Auto) 0.3   


 


Neutrophils # (Auto) 8.8   


 


Lymphocytes # (Auto) 1.6   


 


Monocytes # (Auto) 0.8   


 


Eosinophils # (Auto) 0.1   


 


Basophils # (Auto) 0.0   


 


CBC Comment DIFF FINAL   


 


Differential Comment    


 


Prothrombin Time 10.2   


 


Prothromb Time International


Ratio 1.0 


  


  


 


 


Activated Partial


Thromboplast Time 21.7 


  


  


 


 


Blood Urea Nitrogen 21   


 


Creatinine 1.30   


 


Random Glucose 69   


 


Total Protein 7.2   


 


Albumin 3.4   


 


Calcium Level 9.6   


 


Magnesium Level 2.0   


 


Alkaline Phosphatase 105   


 


Aspartate Amino Transf


(AST/SGOT) 26 


  


  


 


 


Alanine Aminotransferase


(ALT/SGPT) 38 


  


  


 


 


Total Bilirubin 0.3   


 


Sodium Level 140   


 


Potassium Level 3.6   


 


Chloride Level 109   


 


Carbon Dioxide Level 21.9   


 


Anion Gap 9   


 


Estimat Glomerular Filtration


Rate 41 


  


  


 


 


Total Creatine Kinase 103  84  74 


 


Creatine Kinase MB 4.3   


 


Troponin I LESS THAN 0.02  LESS THAN 0.02  LESS THAN 0.02 


 


Lipase 384   








Result Diagram:  


3/25/18 1750                                                                   

             3/25/18 1750





Imaging





Last Impressions








Chest X-Ray 3/25/18 1759 Signed





Impressions: 





 Service Date/Time:  2018 18:17 - CONCLUSION:      

Compensated 





 cardiomegaly otherwise negative         Board Certified Radiologist.  This 





 report was verified electronically.   MD 











Septic Shock Reassessment


Septic shock perfusion:  reassessment completed





Caprini VTE Risk Assessment


Caprini VTE Risk Assessment:  Mod/High Risk (score >= 2)


Caprini Risk Assessment Model











 Point Value = 1          Point Value = 2  Point Value = 3  Point Value = 5


 


Age 41-60


Minor surgery


BMI > 25 kg/m2


Swollen legs


Varicose veins


Pregnancy or postpartum


History of unexplained or recurrent


   spontaneous 


Oral contraceptives or hormone


   replacement


Sepsis (< 1 month)


Serious lung disease, including


   pneumonia (< 1 month)


Abnormal pulmonary function


Acute myocardial infarction


Congestive heart failure (< 1 month)


History of inflammatory bowel disease


Medical patient at bed rest Age 61-74


Arthroscopic surgery


Major open surgery (> 45 min)


Laparoscopic surgery (> 45 min)


Malignancy


Confined to bed (> 72 hours)


Immobilizing plaster cast


Central venous access Age >= 75


History of VTE


Family history of VTE


Factor V Leiden


Prothrombin [id number]


Lupus anticoagulant


Anticardiolipin antibodies


Elevated serum homocysteine


Heparin-induced thrombocytopenia


Other congenital or acquired


   thrombophilia Stroke (< 1 month)


Elective arthroplasty


Hip, pelvis, or leg fracture


Acute spinal cord injury (< 1 month)








Prophylaxis Regimen











   Total Risk


Factor Score Risk Level Prophylaxis Regimen


 


0-1      Low Early ambulation


 


2 Moderate Order ONE of the following:


*Sequential Compression Device (SCD)


*Heparin 5000 units SQ BID


 


3-4 Higher Order ONE of the following medications:


*Heparin 5000 units SQ TID


*Enoxaparin/Lovenox 40 mg SQ daily (WT < 150 kg, CrCl > 30 mL/min)


*Enoxaparin/Lovenox 30 mg SQ daily (WT < 150 kg, CrCl > 10-29 mL/min)


*Enoxaparin/Lovenox 30 mg SQ BID (WT < 150 kg, CrCl > 30 mL/min)


AND/OR


*Sequential Compression Device (SCD)


 


5 or more Highest Order ONE of the following medications:


*Heparin 5000 units SQ TID (Preferred with Epidurals)


*Enoxaparin/Lovenox 40 mg SQ daily (WT < 150 kg, CrCl > 30 mL/min)


*Enoxaparin/Lovenox 30 mg SQ daily (WT < 150 kg, CrCl > 10-29 mL/min)


*Enoxaparin/Lovenox 30 mg SQ BID (WT < 150 kg, CrCl > 30 mL/min)


AND


*Sequential Compression Device (SCD)











Assessment and Plan


Problem List:  


(1) Chest pain


ICD Code:  R07.9 - Chest pain, unspecified


Status:  Acute


Plan:  


Patient has been admitted to the chest pain center for observation.  Serial 

EKGs and serial troponins have been ordered for ruling out ACS purposes.  

Troponin flat EKG reviewed showing no arrhythmia or ST changes.  Chest pain has 

resolved at time of assessment.  Vital signs are stable overnight.  Chest x-ray 

reviewed showing no acute disease.  Pain medication available morphine IV per 

pain scale as well as [place] p.o.  Patient will undergo a nuclear stress test to 

further rule out ischemia.  Supplemental O2 as needed patient is comfortable on 

room air.


Patient is encouraged to follow-up with her neurosurgeon outpatient for 

cervical radiculopathy.  Patient has complaints of right upper extremity 

numbness and tingling as well as some neck pain.  Patient is agreeable to this.





(2) Type 2 diabetes mellitus


ICD Code:  E11.9 - Type 2 diabetes mellitus without complications


Plan:  Accu-Chek before meals at bedtime, cover as needed with insulin sliding 

scale.  Blood sugars have been stable during hospitalization.  Continue to 

follow and monitor.  Continue gabapentin.





(3) Hypertension


ICD Code:  I10 - Essential (primary) hypertension


Plan:  Blood pressure trends noted.  Continue home BP medications.  Monitor 

blood pressure trends. 





(4) Acute kidney failure


ICD Code:  N17.9 - Acute kidney failure, unspecified


Plan:  Probably secondary to dehydration.  Will IV fluids.  Creatinine mildly 

in elevated, should improve with IV fluids.  Avoid nephrotoxins.





(5) Hyperlipidemia


ICD Code:  E78.5 - Hyperlipidemia, unspecified


Plan:  Continue home statin.  PCP to follow levels.





DVT prophylaxis: SCDs.  Heparin.





Assessment and Plan


Nuclear stress test performed showing EF 59% with normal wall motion and no 

fixed or reversible defects suggest ischemia or infarction.  Patient updated 

about results, will allow to eat and be discharged home to follow-up with PCP.  

Patient understands that if pain returns or worsens to present back to the ED 

for further workup.  Patient is agreeable to plan and stable at this time.


Will DC home Norvasc and Clonidine due to reports of hypotension upon 

presentation and while hospitalized with recs to follow up with PCP and follow 

BP.





Problem Qualifiers





(1) Chest pain:  


Qualified Codes:  R07.9 - Chest pain, unspecified








Yadi Benito Mar 26, 2018 12:56

## 2018-03-26 NOTE — RADRPT
EXAM DATE/TIME:  03/26/2018 11:51 

 

[facility] COMPARISON:     

No previous studies available for comparison.

 

 

INDICATIONS :     

Chest pain for 1 day. Angina. 

                           

 

DOSE:     

26.4 mCi Tc99m Myoview at stress.

                     8.1 mCi Tc99m Myoview at rest.

                     0.4 mg Lexiscan

                       

 

 

STRESS SYMPTOMS:      

None noted.

                       

 

 

EJECTION FRACTION:       

59%

                       

 

MEDICAL HISTORY :     

Hypertension.   Cardiomegaly and asthma.

 

SURGICAL HISTORY :      

Inguinal hernia repair. Hysterectomy.  Back surgery.

 

ENCOUNTER:     

Initial

 

ACUITY:     

1 day

 

PAIN SCALE:     

3/10

 

LOCATION:      

Bilateral chest 

 

TECHNIQUE:     

The patient underwent pharmacologic stress with infusion of prescribed dose.  Continuous ECG tracing 
was monitored during stress.  Gated SPECT imaging was performed after stress and conventional SPECT i
maging was performed at rest.  The examination was performed on a SPECT/CT scanner, both attenuation 
and non-corrected datasets were reviewed.

 

FINDINGS:     

 

DISTRIBUTION:     

The maximum perfused segment at stress is in the anterior lateral wall. There is a summed stress scor
e of one.

 

PERFUSION STUDY:     

The pattern of perfusion at stress is within normal limits.

 

GATED STUDY:     

There is intact wall motion and thickening without hypokinetic or dyskinetic segments. 

 

CONCLUSION:     

1. Normal wall motion the calculated ejection fraction of 59%.

2. No fixed or reversible defect to suggest ischemia or infarction.

 

RISK CATEGORY:     Low (<1% Annual Mortality Rate)

 

 

 

 

 Gerson Esqueda MD on March 26, 2018 at 13:39           

Board Certified Radiologist.

 This report was verified electronically.

## 2018-03-26 NOTE — EKG
Date Performed: 03/25/2018       Time Performed: 17:39:57

 

PTAGE:      62 years

 

EKG:      Sinus rhythm 

 

 NONSPECIFIC T-WAVE ABNORMALITY BORDERLINE ECG 

 

NO PREVIOUS TRACING            

 

DOCTOR:   Brandt Harrison  Interpretating Date/Time  03/26/2018 16:02:52

## 2018-03-26 NOTE — TR
Date Performed: 03/26/2018       Time Performed: 12:31:41

 

DOCTOR:      Brandt Harrison 

 

DRUG LIST:     

CLINICAL HISTORY:     

REASON FOR TEST:     

REASON FOR ENDING:     

OBSERVATION:     

CONCLUSION:      Lexiscan stress test was performed under standard four minute protocol.  Radionuclid
e was injected one minute prior to ending the test. No electrocardiographic abormalities were present
 to suggest ischemia. Nuclear imaging and interpretation are pending.

COMMENTS:

## 2018-03-26 NOTE — HHI.DCPOC
Discharge Care Plan


Diagnosis:  


(1) Chest pain


Goals to Promote Your Health


* To prevent worsening of your condition and complications


* To maintain your health at the optimal level


Directions to Meet Your Goals


*** Take your medications as prescribed


*** Follow your dietary instruction


*** Follow activity as directed








*** Keep your appointments as scheduled


*** Take your immunizations and boosters as scheduled


*** If your symptoms worsen call your PCP, if no PCP go to Urgent Care Center 

or Emergency Room***


*** Smoking is Dangerous to Your Health. Avoid second hand smoke***


***Call the 24-hour hour crisis hotline for domestic abuse at 1-732.664.3343***











Yadi Benito Mar 26, 2018 14:00

## 2019-01-02 ENCOUNTER — TRANSCRIBE ORDERS (OUTPATIENT)
Dept: LAB | Facility: OTHER | Age: 64
End: 2019-01-02

## 2019-01-02 DIAGNOSIS — M81.0 AGE-RELATED OSTEOPOROSIS WITHOUT CURRENT PATHOLOGICAL FRACTURE: ICD-10-CM

## 2019-01-02 DIAGNOSIS — E78.5 HYPERLIPIDEMIA, UNSPECIFIED HYPERLIPIDEMIA TYPE: ICD-10-CM

## 2019-01-02 DIAGNOSIS — E11.65 TYPE 2 DIABETES MELLITUS WITH HYPERGLYCEMIA, UNSPECIFIED WHETHER LONG TERM INSULIN USE (HCC): Primary | ICD-10-CM
